# Patient Record
Sex: FEMALE | Race: ASIAN | NOT HISPANIC OR LATINO
[De-identification: names, ages, dates, MRNs, and addresses within clinical notes are randomized per-mention and may not be internally consistent; named-entity substitution may affect disease eponyms.]

---

## 2023-08-22 PROBLEM — Z00.00 ENCOUNTER FOR PREVENTIVE HEALTH EXAMINATION: Status: ACTIVE | Noted: 2023-08-22

## 2023-08-31 ENCOUNTER — NON-APPOINTMENT (OUTPATIENT)
Age: 38
End: 2023-08-31

## 2023-09-02 ENCOUNTER — APPOINTMENT (OUTPATIENT)
Dept: FAMILY MEDICINE | Facility: CLINIC | Age: 38
End: 2023-09-02
Payer: COMMERCIAL

## 2023-09-02 VITALS
OXYGEN SATURATION: 99 % | HEIGHT: 69 IN | HEART RATE: 99 BPM | DIASTOLIC BLOOD PRESSURE: 88 MMHG | BODY MASS INDEX: 25.92 KG/M2 | WEIGHT: 175 LBS | SYSTOLIC BLOOD PRESSURE: 130 MMHG

## 2023-09-02 PROCEDURE — 36415 COLL VENOUS BLD VENIPUNCTURE: CPT

## 2023-09-02 PROCEDURE — 99203 OFFICE O/P NEW LOW 30 MIN: CPT | Mod: 25

## 2023-09-02 NOTE — ASSESSMENT
[FreeTextEntry1] : Gender dysphoria with desire for feminizing hormone therapy will check cbc, cmp, lipid panel, hgba1c, estradiol, prolactin, and testosterone levels after lab work results, will have patient return for appointment to discuss results and treatment plan no mental health concerns for starting hormone therapy at this time, no need for further behavioral health evaluation

## 2023-09-02 NOTE — HISTORY OF PRESENT ILLNESS
[FreeTextEntry8] : 37 y/o transwoman with no significant pmhx presents for new patient visit. Patient identifies as a transgender female and is interested in starting medical transition with feminizing hormone therapy  Patient came out as transgender in high school and began her social transition shortly after. Has been going by [employer] since college. Patient moved from the [facility] 7 years ago. While living in the [facility] she was taking hormone therapy which was self directed through use of over the counter hormonal contraceptive. Has been off hormones for past 10 years  Social Hx: former cigarette smoker, quit 1 year ago, current vaping. Lives home with  who is supportive of her transition, she feels safe at home. Has the support of friends and family as well. Mental health history: no known history of mental health conditions, no psychiatric hospitalizations or suicide attempts. Denies any feelings of depression or thoughts of wanting to harm herself.

## 2023-09-02 NOTE — PHYSICAL EXAM
[Coordination Grossly Intact] : coordination grossly intact [No Focal Deficits] : no focal deficits [Normal Gait] : normal gait [Normal] : affect was normal and insight and judgment were intact [de-identified] : left ear drum with ?tube, however patient denies hx of ear surgery

## 2023-09-06 LAB
ALBUMIN SERPL ELPH-MCNC: 4.8 G/DL
ALP BLD-CCNC: 66 U/L
ALT SERPL-CCNC: 28 U/L
ANION GAP SERPL CALC-SCNC: 17 MMOL/L
AST SERPL-CCNC: 32 U/L
BASOPHILS # BLD AUTO: 0.1 K/UL
BASOPHILS NFR BLD AUTO: 1.3 %
BILIRUB SERPL-MCNC: 0.4 MG/DL
BUN SERPL-MCNC: 21 MG/DL
CALCIUM SERPL-MCNC: 9.3 MG/DL
CHLORIDE SERPL-SCNC: 99 MMOL/L
CHOLEST SERPL-MCNC: 230 MG/DL
CO2 SERPL-SCNC: 21 MMOL/L
CREAT SERPL-MCNC: 1.22 MG/DL
EGFR: 58 ML/MIN/1.73M2
EOSINOPHIL # BLD AUTO: 0.19 K/UL
EOSINOPHIL NFR BLD AUTO: 2.5 %
ESTIMATED AVERAGE GLUCOSE: 103 MG/DL
ESTRADIOL SERPL-MCNC: 26 PG/ML
GLUCOSE SERPL-MCNC: 82 MG/DL
HBA1C MFR BLD HPLC: 5.2 %
HCT VFR BLD CALC: 50 %
HDLC SERPL-MCNC: 74 MG/DL
HGB BLD-MCNC: 17.1 G/DL
IMM GRANULOCYTES NFR BLD AUTO: 0.4 %
LDLC SERPL CALC-MCNC: 132 MG/DL
LYMPHOCYTES # BLD AUTO: 2.28 K/UL
LYMPHOCYTES NFR BLD AUTO: 29.9 %
MAN DIFF?: NORMAL
MCHC RBC-ENTMCNC: 30.4 PG
MCHC RBC-ENTMCNC: 34.2 GM/DL
MCV RBC AUTO: 89 FL
MONOCYTES # BLD AUTO: 0.66 K/UL
MONOCYTES NFR BLD AUTO: 8.7 %
NEUTROPHILS # BLD AUTO: 4.36 K/UL
NEUTROPHILS NFR BLD AUTO: 57.2 %
NONHDLC SERPL-MCNC: 156 MG/DL
PLATELET # BLD AUTO: 274 K/UL
POTASSIUM SERPL-SCNC: 4.6 MMOL/L
PROLACTIN SERPL-MCNC: 11.3 NG/ML
PROT SERPL-MCNC: 7.2 G/DL
RBC # BLD: 5.62 M/UL
RBC # FLD: 12.6 %
SODIUM SERPL-SCNC: 136 MMOL/L
TESTOST SERPL-MCNC: 508 NG/DL
TRIGL SERPL-MCNC: 135 MG/DL
WBC # FLD AUTO: 7.62 K/UL

## 2023-09-08 ENCOUNTER — APPOINTMENT (OUTPATIENT)
Dept: FAMILY MEDICINE | Facility: CLINIC | Age: 38
End: 2023-09-08
Payer: COMMERCIAL

## 2023-09-08 VITALS
HEIGHT: 69 IN | WEIGHT: 175 LBS | RESPIRATION RATE: 12 BRPM | BODY MASS INDEX: 25.92 KG/M2 | DIASTOLIC BLOOD PRESSURE: 80 MMHG | OXYGEN SATURATION: 98 % | HEART RATE: 81 BPM | SYSTOLIC BLOOD PRESSURE: 122 MMHG

## 2023-09-08 PROCEDURE — 99213 OFFICE O/P EST LOW 20 MIN: CPT | Mod: 25

## 2023-09-08 PROCEDURE — 36415 COLL VENOUS BLD VENIPUNCTURE: CPT

## 2023-09-08 NOTE — HISTORY OF PRESENT ILLNESS
[FreeTextEntry1] : follow p [de-identified] : 37 y/o transwoman with no significant pmhx presents for follow up.  Patient came out as transgender in high school and began her social transition shortly after. Has been going by [employer] since college. Patient moved from the [facility] 7 years ago. While living in the [facility] she was taking hormone therapy which was self directed through use of over the counter hormonal contraceptive. Has been off hormones for past 10 years  Social Hx: former cigarette smoker, quit 1 year ago, current vaping. Lives home with  who is supportive of her transition, she feels safe at home. Has the support of friends and family as well. Mental health history: no known history of mental health conditions, no psychiatric hospitalizations or suicide attempts. Denies any feelings of depression or thoughts of wanting to harm herself.    Patient is interested in starting feminizing hormone therapy. At last visit, baseline lab work was taken.

## 2023-09-08 NOTE — ASSESSMENT
[FreeTextEntry1] : Gender dysphoria with desire for feminizing hormone therapy at last visit checked cbc, cmp, lipid panel, hgba1c, estradiol, prolactin, and testosterone levels - reviewed no mental health concerns for starting hormone therapy at this time, no need for further behavioral health evaluation informed consent reviewed with patient and signed, all questions answered will start estradiol 2mg BID and antiandrogen therapy with spironolactone 25mg BID follow up in 3 months  Decreased eGFR creatinine of 1.22 , encouraged to increase oral hydration will repeat bmp today

## 2023-09-09 ENCOUNTER — NON-APPOINTMENT (OUTPATIENT)
Age: 38
End: 2023-09-09

## 2023-09-12 LAB
ANION GAP SERPL CALC-SCNC: 13 MMOL/L
BUN SERPL-MCNC: 18 MG/DL
CALCIUM SERPL-MCNC: 9.9 MG/DL
CHLORIDE SERPL-SCNC: 98 MMOL/L
CO2 SERPL-SCNC: 24 MMOL/L
CREAT SERPL-MCNC: 1.2 MG/DL
EGFR: 59 ML/MIN/1.73M2
GLUCOSE SERPL-MCNC: 81 MG/DL
POTASSIUM SERPL-SCNC: 4.7 MMOL/L
SODIUM SERPL-SCNC: 136 MMOL/L

## 2023-12-12 ENCOUNTER — TRANSCRIPTION ENCOUNTER (OUTPATIENT)
Age: 38
End: 2023-12-12

## 2023-12-12 ENCOUNTER — NON-APPOINTMENT (OUTPATIENT)
Age: 38
End: 2023-12-12

## 2023-12-13 ENCOUNTER — TRANSCRIPTION ENCOUNTER (OUTPATIENT)
Age: 38
End: 2023-12-13

## 2023-12-13 ENCOUNTER — NON-APPOINTMENT (OUTPATIENT)
Age: 38
End: 2023-12-13

## 2023-12-19 ENCOUNTER — NON-APPOINTMENT (OUTPATIENT)
Age: 38
End: 2023-12-19

## 2024-01-10 ENCOUNTER — APPOINTMENT (OUTPATIENT)
Dept: FAMILY MEDICINE | Facility: CLINIC | Age: 39
End: 2024-01-10
Payer: COMMERCIAL

## 2024-01-10 VITALS
SYSTOLIC BLOOD PRESSURE: 108 MMHG | WEIGHT: 171.6 LBS | HEIGHT: 69 IN | RESPIRATION RATE: 14 BRPM | OXYGEN SATURATION: 98 % | HEART RATE: 74 BPM | BODY MASS INDEX: 25.42 KG/M2 | TEMPERATURE: 98.3 F | DIASTOLIC BLOOD PRESSURE: 74 MMHG

## 2024-01-10 PROCEDURE — 36415 COLL VENOUS BLD VENIPUNCTURE: CPT

## 2024-01-10 PROCEDURE — 99213 OFFICE O/P EST LOW 20 MIN: CPT | Mod: 25

## 2024-01-10 NOTE — HISTORY OF PRESENT ILLNESS
[FreeTextEntry1] : follow up [de-identified] : 39 y/o transwoman with no significant pmhx presents for follow up.  Patient came out as transgender in high school and began her social transition shortly after. Has been going by [employer] since college. Patient moved from the [facility] 7 years ago. While living in the [facility] she was taking hormone therapy which was self directed through use of over the counter hormonal contraceptive. Has been off hormones for past 10 years  Social Hx: former cigarette smoker, quit 1 year ago, current vaping. Lives home with  who is supportive of her transition, she feels safe at home. Has the support of friends and family as well. Mental health history: no known history of mental health conditions, no psychiatric hospitalizations or suicide attempts. Denies any feelings of depression or thoughts of wanting to harm herself.    At last visit, patient was started on feminizing hormone therapy with estradiol 2mg twice daily. She was also started on spironolactone 25mg bid. States she is tolerating these medications well. Denies any adverse effects.

## 2024-01-10 NOTE — ASSESSMENT
[FreeTextEntry1] : Gender dysphoria with desire for feminizing hormone therapy will check cbc, cmp, estradiol, prolactin, and testosterone levels today informed consent previously reviewed with patient and signed, all questions answered continue estradiol 2mg BID and antiandrogen therapy with spironolactone 25mg BID follow up in 6 months

## 2024-01-11 ENCOUNTER — NON-APPOINTMENT (OUTPATIENT)
Age: 39
End: 2024-01-11

## 2024-01-11 DIAGNOSIS — F64.9 GENDER IDENTITY DISORDER, UNSPECIFIED: ICD-10-CM

## 2024-01-11 LAB
ALBUMIN SERPL ELPH-MCNC: 4.3 G/DL
ALP BLD-CCNC: 52 U/L
ALT SERPL-CCNC: 19 U/L
ANION GAP SERPL CALC-SCNC: 13 MMOL/L
AST SERPL-CCNC: 19 U/L
BASOPHILS # BLD AUTO: 0.07 K/UL
BASOPHILS NFR BLD AUTO: 1.1 %
BILIRUB SERPL-MCNC: 0.4 MG/DL
BUN SERPL-MCNC: 14 MG/DL
CALCIUM SERPL-MCNC: 9 MG/DL
CHLORIDE SERPL-SCNC: 102 MMOL/L
CO2 SERPL-SCNC: 21 MMOL/L
CREAT SERPL-MCNC: 0.86 MG/DL
EGFR: 88 ML/MIN/1.73M2
EOSINOPHIL # BLD AUTO: 0.31 K/UL
EOSINOPHIL NFR BLD AUTO: 5 %
ESTRADIOL SERPL-MCNC: 74 PG/ML
GLUCOSE SERPL-MCNC: 93 MG/DL
HCT VFR BLD CALC: 49.3 %
HGB BLD-MCNC: 16.1 G/DL
IMM GRANULOCYTES NFR BLD AUTO: 0.3 %
LYMPHOCYTES # BLD AUTO: 1.81 K/UL
LYMPHOCYTES NFR BLD AUTO: 29.1 %
MAN DIFF?: NORMAL
MCHC RBC-ENTMCNC: 29.6 PG
MCHC RBC-ENTMCNC: 32.7 GM/DL
MCV RBC AUTO: 90.6 FL
MONOCYTES # BLD AUTO: 0.42 K/UL
MONOCYTES NFR BLD AUTO: 6.8 %
NEUTROPHILS # BLD AUTO: 3.58 K/UL
NEUTROPHILS NFR BLD AUTO: 57.7 %
PLATELET # BLD AUTO: 290 K/UL
POTASSIUM SERPL-SCNC: 4.3 MMOL/L
PROLACTIN SERPL-MCNC: 19.4 NG/ML
PROT SERPL-MCNC: 6.8 G/DL
RBC # BLD: 5.44 M/UL
RBC # FLD: 12.9 %
SODIUM SERPL-SCNC: 137 MMOL/L
TESTOST SERPL-MCNC: 481 NG/DL
WBC # FLD AUTO: 6.21 K/UL

## 2024-02-07 ENCOUNTER — RX CHANGE (OUTPATIENT)
Age: 39
End: 2024-02-07

## 2024-02-07 RX ORDER — SPIRONOLACTONE 50 MG/1
50 TABLET ORAL
Qty: 60 | Refills: 3 | Status: DISCONTINUED | COMMUNITY
Start: 2023-09-08 | End: 2024-02-07

## 2024-02-29 LAB
ALBUMIN SERPL ELPH-MCNC: 4.7 G/DL
ALP BLD-CCNC: 42 U/L
ALT SERPL-CCNC: 21 U/L
ANION GAP SERPL CALC-SCNC: 14 MMOL/L
AST SERPL-CCNC: 19 U/L
BILIRUB SERPL-MCNC: 0.5 MG/DL
BUN SERPL-MCNC: 15 MG/DL
CALCIUM SERPL-MCNC: 9.5 MG/DL
CHLORIDE SERPL-SCNC: 99 MMOL/L
CO2 SERPL-SCNC: 23 MMOL/L
CREAT SERPL-MCNC: 0.91 MG/DL
EGFR: 82 ML/MIN/1.73M2
ESTRADIOL SERPL-MCNC: 183 PG/ML
GLUCOSE SERPL-MCNC: 85 MG/DL
POTASSIUM SERPL-SCNC: 4.5 MMOL/L
PROT SERPL-MCNC: 7.4 G/DL
SODIUM SERPL-SCNC: 136 MMOL/L
TESTOST SERPL-MCNC: 152 NG/DL

## 2024-03-27 ENCOUNTER — RX CHANGE (OUTPATIENT)
Age: 39
End: 2024-03-27

## 2024-03-27 RX ORDER — ESTRADIOL 2 MG/1
2 TABLET ORAL
Qty: 180 | Refills: 1 | Status: DISCONTINUED | COMMUNITY
Start: 2023-09-08 | End: 2024-03-27

## 2024-03-27 RX ORDER — ESTRADIOL 2 MG/1
2 TABLET ORAL
Qty: 360 | Refills: 1 | Status: ACTIVE | COMMUNITY
Start: 1900-01-01 | End: 1900-01-01

## 2024-05-13 ENCOUNTER — RX RENEWAL (OUTPATIENT)
Age: 39
End: 2024-05-13

## 2024-05-13 RX ORDER — SPIRONOLACTONE 50 MG/1
50 TABLET ORAL
Qty: 180 | Refills: 0 | Status: ACTIVE | COMMUNITY
Start: 1900-01-01 | End: 1900-01-01

## 2024-07-06 ENCOUNTER — APPOINTMENT (OUTPATIENT)
Dept: FAMILY MEDICINE | Facility: CLINIC | Age: 39
End: 2024-07-06

## 2024-08-19 ENCOUNTER — RX RENEWAL (OUTPATIENT)
Age: 39
End: 2024-08-19